# Patient Record
Sex: FEMALE | Race: BLACK OR AFRICAN AMERICAN | NOT HISPANIC OR LATINO
[De-identification: names, ages, dates, MRNs, and addresses within clinical notes are randomized per-mention and may not be internally consistent; named-entity substitution may affect disease eponyms.]

---

## 2019-11-06 ENCOUNTER — TRANSCRIPTION ENCOUNTER (OUTPATIENT)
Age: 59
End: 2019-11-06

## 2021-05-07 ENCOUNTER — EMERGENCY (EMERGENCY)
Facility: HOSPITAL | Age: 61
LOS: 1 days | Discharge: HOME | End: 2021-05-07
Admitting: EMERGENCY MEDICINE
Payer: MEDICAID

## 2021-05-07 VITALS
HEART RATE: 88 BPM | OXYGEN SATURATION: 100 % | SYSTOLIC BLOOD PRESSURE: 164 MMHG | DIASTOLIC BLOOD PRESSURE: 97 MMHG | RESPIRATION RATE: 18 BRPM | HEIGHT: 61 IN | WEIGHT: 179.9 LBS | TEMPERATURE: 98 F

## 2021-05-07 DIAGNOSIS — R22.32 LOCALIZED SWELLING, MASS AND LUMP, LEFT UPPER LIMB: ICD-10-CM

## 2021-05-07 PROCEDURE — 99282 EMERGENCY DEPT VISIT SF MDM: CPT

## 2021-05-07 NOTE — ED PROVIDER NOTE - CLINICAL SUMMARY MEDICAL DECISION MAKING FREE TEXT BOX
60 yo female in the Er c/o lump under her left armpit for about 3 month.   Pt denies any discoloration or pain in the area of concerned. Pt states she had covid -19 vaccine injection done in her left UE about 3 weeks ago and since that her left shoulder  feels achy. No limitation in movement of shoulder or arm noted, no numbness to RUE, no chest pain , breast pain. Pt also states that her PMD examined this lump 2 month ago and didn't recommend to do any further investigation. Pt became concerned about lump more after her vaccine injection.  exam- unremarkable.  no clinical findings to suspect an infection, no pain, no breast mass or pain, shoulder exam- normal. Possibility of lipoma vs cyst vs lymphadenopathy discussed with pt. No indication for any emergent work up need at this time. comfortable for discharge  for out pt care.

## 2021-05-07 NOTE — ED PROVIDER NOTE - OBJECTIVE STATEMENT
62 yo female in the Er c/o lump under her left armpit for about 3 month.   Pt denies any discoloration or pain in the area of concerned. Pt states she had covid -19 vaccine injection done in her left UE about 3 weeks ago and since that her left shoulder  feels achy. No limitation in movement of shoulder or arm noted, no numbness to RUE, no chest pain , breast pain. Pt also states that her PMD examined this lump 2 month ago and didn't recommend to do any further investigation. Pt became concerned about lump more after her vaccine injection.

## 2021-05-07 NOTE — ED ADULT NURSE NOTE - CHIEF COMPLAINT QUOTE
60 y/o female c/o lump on left axilla for the past two months, Pt reports negative mammogram in July 2020.

## 2021-05-07 NOTE — ED PROVIDER NOTE - SKIN, MLM
Skin normal color for race, warm, dry and intact. No evidence of rash.  localized induration, soft, non-tender, non-fluctuant, bout 10 cm, under the left armpit.

## 2021-05-07 NOTE — ED ADULT TRIAGE NOTE - CHIEF COMPLAINT QUOTE
62 y/o female c/o lump on left axilla for the past two months, Pt reports negative mammogram in July 2020.

## 2021-05-07 NOTE — ED PROVIDER NOTE - NSFOLLOWUPINSTRUCTIONS_ED_ALL_ED_FT
Please follow up with your PMD and follow up for an ultrasound.     LIPOMA - General Information           Lipoma    WHAT YOU NEED TO KNOW:    What is a lipoma? A lipoma is a benign (non cancer) tumor made up of fat tissue. Lipomas can form anywhere in your body, but are usually found on the back, shoulders, neck, and head. The cause of lipomas is unknown. Some types of lipomas may run in families. They most often appear between age 40 and 60.     What are the signs and symptoms of a lipoma? A lipoma looks like a round lump of tissue. It may feel soft and rubbery. Lipomas move around underneath your skin when you press on them. They usually do not hurt. If your lipoma grows large, it may cause pain.     How is a lipoma diagnosed? Your healthcare provider will examine your lipoma. Tell him or her how long you have had the lipoma, and any other symptoms you have. He or she may take a sample of tissue and send it to a lab for tests. This procedure is called a biopsy.     How is a lipoma treated? You may not need any treatment if the lipoma does not bother you. Your healthcare provider may recommend regular follow-up visits to check the lipoma for changes. Injections may help shrink your lipoma. You may need surgery to remove the lipoma if it is large, painful, or causes other symptoms.     When should I contact my healthcare provider?   •Your lipoma is getting bigger.       •Your lipoma causes new or increased pain.       •You develop new symptoms.       •You have questions or concerns about your condition or care.      CARE AGREEMENT:    You have the right to help plan your care. Learn about your health condition and how it may be treated. Discuss treatment options with your healthcare providers to decide what care you want to receive. You always have the right to refuse treatment.

## 2021-05-07 NOTE — ED PROVIDER NOTE - PATIENT PORTAL LINK FT
You can access the FollowMyHealth Patient Portal offered by [facility] by registering at the following website: [email or web address]. By joining [employer]’s FollowMyHealth portal, you will also be able to view your health information using other applications (apps) compatible with our system.

## 2024-03-15 ENCOUNTER — EMERGENCY (EMERGENCY)
Facility: HOSPITAL | Age: 64
LOS: 1 days | Discharge: HOME | End: 2024-03-15
Attending: STUDENT IN AN ORGANIZED HEALTH CARE EDUCATION/TRAINING PROGRAM | Admitting: STUDENT IN AN ORGANIZED HEALTH CARE EDUCATION/TRAINING PROGRAM
Payer: MEDICAID

## 2024-03-15 VITALS
TEMPERATURE: 98 F | SYSTOLIC BLOOD PRESSURE: 151 MMHG | HEART RATE: 84 BPM | WEIGHT: 171.96 LBS | RESPIRATION RATE: 18 BRPM | DIASTOLIC BLOOD PRESSURE: 89 MMHG | OXYGEN SATURATION: 98 %

## 2024-03-15 VITALS
TEMPERATURE: 98 F | OXYGEN SATURATION: 95 % | HEART RATE: 78 BPM | SYSTOLIC BLOOD PRESSURE: 141 MMHG | DIASTOLIC BLOOD PRESSURE: 79 MMHG | RESPIRATION RATE: 18 BRPM

## 2024-03-15 PROCEDURE — 99284 EMERGENCY DEPT VISIT MOD MDM: CPT

## 2024-03-15 PROCEDURE — 99283 EMERGENCY DEPT VISIT LOW MDM: CPT | Mod: 25

## 2024-03-15 PROCEDURE — 96372 THER/PROPH/DIAG INJ SC/IM: CPT

## 2024-03-15 RX ORDER — KETOROLAC TROMETHAMINE 30 MG/ML
30 SYRINGE (ML) INJECTION ONCE
Refills: 0 | Status: DISCONTINUED | OUTPATIENT
Start: 2024-03-15 | End: 2024-03-15

## 2024-03-15 RX ADMIN — Medication 30 MILLIGRAM(S): at 20:10

## 2024-03-15 NOTE — ED PROVIDER NOTE - NS ED ROS FT
CONSTITUTIONAL: No fever, no chills, no fatigue  EYES: No eye redness, no visual changes  ENT: No ear pain, no sore throat, + neck pain  CARDIOVASCULAR: No chest pain, no palpitations  RESPIRATORY: No cough, no SOB  GI: No abdominal pain, no nausea, no vomiting, no constipation, no diarrhea  GENITOURINARY: No dysuria, no frequency, no hematuria  MUSCULOSKELETAL: No back pain, no joint pain, no myalgias  SKIN: No rash, no peripheral edema  NEURO: No headache, no confusion    ALL OTHER SYSTEMS NEGATIVE.

## 2024-03-15 NOTE — ED PROVIDER NOTE - NSFOLLOWUPINSTRUCTIONS_ED_ALL_ED_FT
Follow up with your primary medical doctor as soon as possible.    Follow up with cardiology - call [phone] to schedule an appointment.     Return to the emergency department if your symptoms worsen or if you develop new symptoms.  If you have any problems with followup, please call the ED Referral Coordinator at [phone].

## 2024-03-15 NOTE — ED PROVIDER NOTE - PATIENT PORTAL LINK FT
You can access the FollowMyHealth Patient Portal offered by [facility] by registering at the following website: [email or web address]. By joining [employer]’s FollowMyHealth portal, you will also be able to view your health information using other applications (apps) compatible with our system.

## 2024-03-15 NOTE — ED ADULT TRIAGE NOTE - CHIEF COMPLAINT QUOTE
Patient PMH DM and HTN to the ED c/o posterior neck pain radiating down back and to chest, describes "burning sensation". Denies recent injury/trauma. AAOx4, NAD.

## 2024-03-15 NOTE — ED PROVIDER NOTE - OBJECTIVE STATEMENT
62 yo F w PMH of HTN, DM, p/w R sided burning neck pain, radiating to RUE and R breast. Pain is non-exertional. It is made worse by movement of neck and palpation of skin. No leg swelling, cough, hemoptysis, exogenous estrogen, recent travel, surgery, malignancy, personal or FHx of VTE.

## 2024-03-15 NOTE — ED ADULT NURSE NOTE - OBJECTIVE STATEMENT
63y F pmHx HTN, DM, thyroid disorder presents to ED c/o R neck pain radiating to R back/chest. Endorses intermittent "burning" pain x1week. Denies numbness/tingling, SOB, N/V, dizziness/vision changes, loss of balance, bowel/bladder dysfunction, other acute sx at this time. Pt reports partial pain relief with ibuprofen at home. Maternal hx cardiac disease, pt states "I just wanted to get it checked out." Pt AAOx4, speaking in full and clear sentences, NAD at this time. Ambulatory with steady gait on arrival.

## 2024-03-15 NOTE — ED PROVIDER NOTE - PHYSICAL EXAMINATION
CONSTITUTIONAL: Non-toxic; in no apparent distress  HEAD: Normocephalic; atraumatic  EYES: PERRL; EOM intact   ENMT: External appears normal  NECK: Supple; + tender to light touch of R neck, no midline spinal ttp, + spurling test on R  CARD: Normal S1, S2; no murmurs, rubs, or gallops  RESP: Normal chest excursion with respiration; breath sounds clear and equal bilaterally  ABD: Soft, non-distended; non-tender  EXT: Normal ROM in all four extremities; non-tender to palpation, no peripheral edema  SKIN: Warm, dry, no rash  NEURO:  No focal neurological deficiencies

## 2024-03-15 NOTE — ED PROVIDER NOTE - CLINICAL SUMMARY MEDICAL DECISION MAKING FREE TEXT BOX
R-sided neck pain radiating to RUE and breast. Made worse by head movement, +spurling test. + ttp.  Appears to be cervical radiculopathy. No s/s of cord compression.  Low concern for ACS given reproducibility of symptoms and history. EKG unremarkable.  Pain improved w treatment, recommend PMD and cards f/u.

## 2024-03-18 DIAGNOSIS — M54.12 RADICULOPATHY, CERVICAL REGION: ICD-10-CM

## 2024-03-18 DIAGNOSIS — M54.2 CERVICALGIA: ICD-10-CM

## 2024-03-18 DIAGNOSIS — E11.9 TYPE 2 DIABETES MELLITUS WITHOUT COMPLICATIONS: ICD-10-CM

## 2024-03-18 DIAGNOSIS — I10 ESSENTIAL (PRIMARY) HYPERTENSION: ICD-10-CM
